# Patient Record
Sex: FEMALE | Race: WHITE | Employment: FULL TIME
[De-identification: names, ages, dates, MRNs, and addresses within clinical notes are randomized per-mention and may not be internally consistent; named-entity substitution may affect disease eponyms.]

---

## 2017-01-03 ENCOUNTER — OFFICE VISIT (OUTPATIENT)
Dept: FAMILY MEDICINE CLINIC | Facility: CLINIC | Age: 27
End: 2017-01-03

## 2017-01-03 VITALS
HEART RATE: 92 BPM | WEIGHT: 155 LBS | DIASTOLIC BLOOD PRESSURE: 70 MMHG | BODY MASS INDEX: 21.62 KG/M2 | SYSTOLIC BLOOD PRESSURE: 124 MMHG

## 2017-01-03 DIAGNOSIS — R10.11 RIGHT UPPER QUADRANT ABDOMINAL PAIN: Primary | ICD-10-CM

## 2017-01-03 PROCEDURE — 99213 OFFICE O/P EST LOW 20 MIN: CPT | Performed by: FAMILY MEDICINE

## 2017-01-03 RX ORDER — OMEPRAZOLE 20 MG/1
20 CAPSULE, DELAYED RELEASE ORAL DAILY
COMMUNITY
End: 2017-03-27 | Stop reason: ALTCHOICE

## 2017-01-11 DIAGNOSIS — R10.11 RIGHT UPPER QUADRANT ABDOMINAL PAIN: ICD-10-CM

## 2017-01-13 ENCOUNTER — TELEPHONE (OUTPATIENT)
Dept: FAMILY MEDICINE CLINIC | Facility: CLINIC | Age: 27
End: 2017-01-13

## 2017-01-13 DIAGNOSIS — K82.8 BILIARY DYSKINESIA: Primary | ICD-10-CM

## 2017-03-27 ENCOUNTER — OFFICE VISIT (OUTPATIENT)
Dept: FAMILY MEDICINE CLINIC | Age: 27
End: 2017-03-27
Payer: MEDICARE

## 2017-03-27 ENCOUNTER — TELEPHONE (OUTPATIENT)
Dept: FAMILY MEDICINE CLINIC | Age: 27
End: 2017-03-27

## 2017-03-27 VITALS
HEART RATE: 96 BPM | OXYGEN SATURATION: 98 % | BODY MASS INDEX: 21.48 KG/M2 | DIASTOLIC BLOOD PRESSURE: 80 MMHG | WEIGHT: 154 LBS | SYSTOLIC BLOOD PRESSURE: 110 MMHG

## 2017-03-27 DIAGNOSIS — S83.8X1A MENISCAL INJURY, RIGHT, INITIAL ENCOUNTER: Primary | ICD-10-CM

## 2017-03-27 PROCEDURE — 99214 OFFICE O/P EST MOD 30 MIN: CPT | Performed by: FAMILY MEDICINE

## 2017-03-27 RX ORDER — HYDROCODONE BITARTRATE AND ACETAMINOPHEN 5; 325 MG/1; MG/1
TABLET ORAL
Qty: 50 TABLET | Refills: 0 | Status: SHIPPED | OUTPATIENT
Start: 2017-03-27 | End: 2017-12-06

## 2017-04-04 ENCOUNTER — TELEPHONE (OUTPATIENT)
Dept: FAMILY MEDICINE CLINIC | Age: 27
End: 2017-04-04

## 2017-04-07 ENCOUNTER — TELEPHONE (OUTPATIENT)
Dept: FAMILY MEDICINE CLINIC | Age: 27
End: 2017-04-07

## 2017-04-07 DIAGNOSIS — S83.8X1A MENISCAL INJURY, RIGHT, INITIAL ENCOUNTER: ICD-10-CM

## 2017-04-07 DIAGNOSIS — S83.209A MENISCUS TEAR: Primary | ICD-10-CM

## 2017-12-06 ENCOUNTER — HOSPITAL ENCOUNTER (OUTPATIENT)
Age: 27
Setting detail: SPECIMEN
Discharge: HOME | End: 2017-12-06
Payer: MEDICARE

## 2017-12-06 ENCOUNTER — OFFICE VISIT (OUTPATIENT)
Dept: FAMILY MEDICINE CLINIC | Age: 27
End: 2017-12-06
Payer: MEDICARE

## 2017-12-06 VITALS
DIASTOLIC BLOOD PRESSURE: 74 MMHG | OXYGEN SATURATION: 99 % | HEIGHT: 65 IN | BODY MASS INDEX: 26.49 KG/M2 | WEIGHT: 159 LBS | SYSTOLIC BLOOD PRESSURE: 108 MMHG | HEART RATE: 86 BPM

## 2017-12-06 DIAGNOSIS — R10.11 RUQ PAIN: ICD-10-CM

## 2017-12-06 DIAGNOSIS — R10.11 RUQ PAIN: Primary | ICD-10-CM

## 2017-12-06 LAB
ABSOLUTE EOS #: 0.12 K/UL (ref 0–0.44)
ABSOLUTE IMMATURE GRANULOCYTE: 0.03 K/UL (ref 0–0.3)
ABSOLUTE LYMPH #: 3.01 K/UL (ref 1.1–3.7)
ABSOLUTE MONO #: 0.46 K/UL (ref 0.1–1.2)
ALBUMIN SERPL-MCNC: 4.9 G/DL (ref 3.5–5.2)
ALBUMIN/GLOBULIN RATIO: 2 (ref 1–2.5)
ALP BLD-CCNC: 40 U/L (ref 35–104)
ALT SERPL-CCNC: 13 U/L (ref 5–33)
ANION GAP SERPL CALCULATED.3IONS-SCNC: 15 MMOL/L (ref 9–17)
AST SERPL-CCNC: 14 U/L
BASOPHILS # BLD: 1 % (ref 0–2)
BASOPHILS ABSOLUTE: 0.05 K/UL (ref 0–0.2)
BILIRUB SERPL-MCNC: 0.59 MG/DL (ref 0.3–1.2)
BILIRUBIN URINE: NEGATIVE
BUN BLDV-MCNC: 10 MG/DL (ref 6–20)
BUN/CREAT BLD: NORMAL (ref 9–20)
CALCIUM SERPL-MCNC: 9.2 MG/DL (ref 8.6–10.4)
CHLORIDE BLD-SCNC: 99 MMOL/L (ref 98–107)
CO2: 23 MMOL/L (ref 20–31)
COLOR: YELLOW
COMMENT UA: NORMAL
CREAT SERPL-MCNC: 0.55 MG/DL (ref 0.5–0.9)
DIFFERENTIAL TYPE: ABNORMAL
EOSINOPHILS RELATIVE PERCENT: 1 % (ref 1–4)
GFR AFRICAN AMERICAN: >60 ML/MIN
GFR NON-AFRICAN AMERICAN: >60 ML/MIN
GFR SERPL CREATININE-BSD FRML MDRD: NORMAL ML/MIN/{1.73_M2}
GFR SERPL CREATININE-BSD FRML MDRD: NORMAL ML/MIN/{1.73_M2}
GLUCOSE BLD-MCNC: 84 MG/DL (ref 70–99)
GLUCOSE URINE: NEGATIVE
HCT VFR BLD CALC: 46.8 % (ref 36.3–47.1)
HEMOGLOBIN: 15.4 G/DL (ref 11.9–15.1)
IMMATURE GRANULOCYTES: 0 %
KETONES, URINE: NEGATIVE
LEUKOCYTE ESTERASE, URINE: NEGATIVE
LYMPHOCYTES # BLD: 36 % (ref 24–43)
MCH RBC QN AUTO: 28.8 PG (ref 25.2–33.5)
MCHC RBC AUTO-ENTMCNC: 32.9 G/DL (ref 28.4–34.8)
MCV RBC AUTO: 87.6 FL (ref 82.6–102.9)
MONOCYTES # BLD: 6 % (ref 3–12)
NITRITE, URINE: NEGATIVE
PDW BLD-RTO: 12.6 % (ref 11.8–14.4)
PH UA: 6 (ref 5–8)
PLATELET # BLD: 358 K/UL (ref 138–453)
PLATELET ESTIMATE: ABNORMAL
PMV BLD AUTO: 10.3 FL (ref 8.1–13.5)
POTASSIUM SERPL-SCNC: 4.5 MMOL/L (ref 3.7–5.3)
PROTEIN UA: NEGATIVE
RBC # BLD: 5.34 M/UL (ref 3.95–5.11)
RBC # BLD: ABNORMAL 10*6/UL
SEG NEUTROPHILS: 56 % (ref 36–65)
SEGMENTED NEUTROPHILS ABSOLUTE COUNT: 4.74 K/UL (ref 1.5–8.1)
SODIUM BLD-SCNC: 137 MMOL/L (ref 135–144)
SPECIFIC GRAVITY UA: 1.02 (ref 1–1.03)
TOTAL PROTEIN: 7.3 G/DL (ref 6.4–8.3)
TURBIDITY: CLEAR
URINE HGB: NEGATIVE
UROBILINOGEN, URINE: NORMAL
WBC # BLD: 8.4 K/UL (ref 3.5–11.3)
WBC # BLD: ABNORMAL 10*3/UL

## 2017-12-06 PROCEDURE — 4004F PT TOBACCO SCREEN RCVD TLK: CPT | Performed by: FAMILY MEDICINE

## 2017-12-06 PROCEDURE — G8427 DOCREV CUR MEDS BY ELIG CLIN: HCPCS | Performed by: FAMILY MEDICINE

## 2017-12-06 PROCEDURE — G8419 CALC BMI OUT NRM PARAM NOF/U: HCPCS | Performed by: FAMILY MEDICINE

## 2017-12-06 PROCEDURE — G8484 FLU IMMUNIZE NO ADMIN: HCPCS | Performed by: FAMILY MEDICINE

## 2017-12-06 PROCEDURE — 99213 OFFICE O/P EST LOW 20 MIN: CPT | Performed by: FAMILY MEDICINE

## 2017-12-06 ASSESSMENT — ENCOUNTER SYMPTOMS
BLOOD IN STOOL: 0
ABDOMINAL PAIN: 1
NAUSEA: 0
CONSTIPATION: 0
DIARRHEA: 0

## 2017-12-06 NOTE — PROGRESS NOTES
Visit Information    Have you changed or started any medications since your last visit including any over-the-counter medicines, vitamins, or herbal medicines? no   Have you stopped taking any of your medications? Is so, why? -  no  Are you having any side effects from any of your medications? - no    Have you seen any other physician or provider since your last visit?  no   Have you had any other diagnostic tests since your last visit?  no   Have you been seen in the emergency room and/or had an admission in a hospital since we last saw you?  no   Have you had your routine dental cleaning in the past 6 months?  no     Do you have an active MyChart account? If no, what is the barrier?   Yes    Patient Care Team:  Chelle Del Rio MD as PCP - General    Medical History Review  Past Medical, Family, and Social History reviewed and does contribute to the patient presenting condition    Health Maintenance   Topic Date Due    HIV screen  04/25/2005    Pneumococcal med risk (1 of 1 - PPSV23) 04/25/2009    Cervical cancer screen  04/25/2011    Flu vaccine (1) 09/01/2017    DTaP/Tdap/Td vaccine (2 - Td) 02/05/2018

## 2017-12-06 NOTE — PROGRESS NOTES
Subjective:      Patient ID: Keily Falcon is a 32 y.o. female. HPI  2 day hx bite abd,  She noted the bite 2 days ago felt was a spider this appears to be resolving then she developed the pain after that,  No  gi sx. Dev ruq pain since, sl in back too lmp is week ago,  Had gely this year,  Review of Systems   Constitutional: Negative for appetite change and fever. Gastrointestinal: Positive for abdominal pain. Negative for blood in stool, constipation, diarrhea and nausea. Genitourinary: Negative. Objective:   Physical Exam   Constitutional: She is oriented to person, place, and time. She appears well-developed and well-nourished. No distress. Abdominal: Soft. Bowel sounds are normal. She exhibits no distension and no mass. There is tenderness (pos murphys,   sl rt  cva pain). There is no rebound and no guarding. Musculoskeletal: She exhibits no edema. Neurological: She is alert and oriented to person, place, and time. Skin: Skin is warm and dry. No rash noted. She is not diaphoretic. No erythema. She has a faint oval lesion on the left upper quadrant about the size of a dime no erythema doesn't appear infected just slight scaling 2 small scabs   Psychiatric: She has a normal mood and affect.  Her behavior is normal. Thought content normal.       Assessment:      insect bite  ruq pain? etiology      Plan:      Labs  F/u accordingly

## 2017-12-08 DIAGNOSIS — R10.11 RUQ PAIN: Primary | ICD-10-CM

## 2017-12-13 DIAGNOSIS — R10.11 RUQ PAIN: Primary | ICD-10-CM

## 2017-12-22 ENCOUNTER — HOSPITAL ENCOUNTER (OUTPATIENT)
Dept: ULTRASOUND IMAGING | Age: 27
Discharge: HOME | End: 2017-12-22
Payer: MEDICARE

## 2017-12-22 DIAGNOSIS — R10.11 RUQ PAIN: ICD-10-CM

## 2017-12-22 PROCEDURE — 76705 ECHO EXAM OF ABDOMEN: CPT

## 2018-03-22 ENCOUNTER — TELEPHONE (OUTPATIENT)
Dept: FAMILY MEDICINE CLINIC | Age: 28
End: 2018-03-22

## 2018-03-30 ENCOUNTER — OFFICE VISIT (OUTPATIENT)
Dept: FAMILY MEDICINE CLINIC | Age: 28
End: 2018-03-30
Payer: MEDICARE

## 2018-03-30 VITALS
HEART RATE: 90 BPM | SYSTOLIC BLOOD PRESSURE: 110 MMHG | BODY MASS INDEX: 26.96 KG/M2 | WEIGHT: 162 LBS | DIASTOLIC BLOOD PRESSURE: 80 MMHG | OXYGEN SATURATION: 98 %

## 2018-03-30 DIAGNOSIS — Z00.00 WELL ADULT EXAM: Primary | ICD-10-CM

## 2018-03-30 DIAGNOSIS — K21.9 GASTROESOPHAGEAL REFLUX DISEASE WITHOUT ESOPHAGITIS: ICD-10-CM

## 2018-03-30 DIAGNOSIS — F17.200 SMOKER: Chronic | ICD-10-CM

## 2018-03-30 PROCEDURE — 90471 IMMUNIZATION ADMIN: CPT | Performed by: FAMILY MEDICINE

## 2018-03-30 PROCEDURE — 90715 TDAP VACCINE 7 YRS/> IM: CPT | Performed by: FAMILY MEDICINE

## 2018-03-30 PROCEDURE — 90746 HEPB VACCINE 3 DOSE ADULT IM: CPT | Performed by: FAMILY MEDICINE

## 2018-03-30 PROCEDURE — 99395 PREV VISIT EST AGE 18-39: CPT | Performed by: FAMILY MEDICINE

## 2018-03-30 PROCEDURE — 90472 IMMUNIZATION ADMIN EACH ADD: CPT | Performed by: FAMILY MEDICINE

## 2018-03-30 RX ORDER — MELOXICAM 15 MG/1
TABLET ORAL DAILY
COMMUNITY
Start: 2018-01-23 | End: 2019-10-16 | Stop reason: ALTCHOICE

## 2018-03-30 NOTE — PROGRESS NOTES
of motion. Neck supple. No tracheal deviation present. No abnormal lymphadenopathy. No JVD noted. Carotids are clear bilaterally. No thyroid masses noted. Heart: RRR without murmur. No S3, S4, or gallop noted. Chest: Clear to auscultation bilaterally. Good breath sounds noted. No rales, wheezes, or rhonchi noted. No respiratory retractions noted. Wall has symmetrical movement with respirations. Abdomen: No distension noted.  + bowel sounds in all quadrants which are normoactive. No bruits noted. No masses could be palpated. No unusual pulsatile masses noted. To deep palpation, patient denied any significant pain. No rebound, guarding or rigidity noted to my exam.          Assessment:   Encounter Diagnoses   Name Primary?  Well adult exam Yes    Gastroesophageal reflux disease without esophagitis      Encounter Diagnoses   Name Primary?  Well adult exam Yes    Gastroesophageal reflux disease without esophagitis     Smoker          Plan:   There are no discontinued medications. THE ABOVE NOTED DISCONTINUED MEDS MAY ONLY BE FROM 'CLEANING UP' THE MED LIST AND WERE NOT ACTUALLY CANCELED;  SEE CHART FOR DETAILS! No orders of the defined types were placed in this encounter. Orders Placed This Encounter   Procedures    Tdap (age 10y-63y) IM (Adacel)    Hep B Vaccine Adult (ENGERIX B)    Lipid Panel     Standing Status:   Future     Standing Expiration Date:   3/30/2019     Order Specific Question:   Is Patient Fasting?/# of Hours     Answer:   8 hour fasting (no calories)     No Follow-up on file. Patient Instructions       Patient Education        Gastroesophageal Reflux Disease (GERD): Care Instructions  Your Care Instructions    Gastroesophageal reflux disease (GERD) is the backward flow of stomach acid into the esophagus. The esophagus is the tube that leads from your throat to your stomach. A one-way valve prevents the stomach acid from moving up into this tube.  When you have GERD,

## 2018-07-03 ENCOUNTER — TELEPHONE (OUTPATIENT)
Dept: FAMILY MEDICINE CLINIC | Age: 28
End: 2018-07-03

## 2018-07-03 ENCOUNTER — HOSPITAL ENCOUNTER (OUTPATIENT)
Age: 28
Setting detail: SPECIMEN
Discharge: HOME | End: 2018-07-03
Payer: MEDICARE

## 2018-07-03 ENCOUNTER — OFFICE VISIT (OUTPATIENT)
Dept: FAMILY MEDICINE CLINIC | Age: 28
End: 2018-07-03
Payer: MEDICARE

## 2018-07-03 VITALS
BODY MASS INDEX: 26.33 KG/M2 | DIASTOLIC BLOOD PRESSURE: 72 MMHG | WEIGHT: 158.2 LBS | SYSTOLIC BLOOD PRESSURE: 102 MMHG | TEMPERATURE: 102.9 F | HEART RATE: 98 BPM

## 2018-07-03 DIAGNOSIS — R50.9 FEVER, UNSPECIFIED FEVER CAUSE: ICD-10-CM

## 2018-07-03 DIAGNOSIS — J02.9 ACUTE PHARYNGITIS, UNSPECIFIED ETIOLOGY: Primary | ICD-10-CM

## 2018-07-03 DIAGNOSIS — R10.819 ABDOMINAL TENDERNESS, REBOUND TENDERNESS PRESENCE NOT SPECIFIED, UNSPECIFIED LOCATION: ICD-10-CM

## 2018-07-03 LAB
ABSOLUTE EOS #: <0.03 K/UL (ref 0–0.44)
ABSOLUTE IMMATURE GRANULOCYTE: 0.04 K/UL (ref 0–0.3)
ABSOLUTE LYMPH #: 0.89 K/UL (ref 1.1–3.7)
ABSOLUTE MONO #: 1.23 K/UL (ref 0.1–1.2)
ALBUMIN SERPL-MCNC: 4.4 G/DL (ref 3.5–5.2)
ALBUMIN/GLOBULIN RATIO: 1.6 (ref 1–2.5)
ALP BLD-CCNC: 42 U/L (ref 35–104)
ALT SERPL-CCNC: 65 U/L (ref 5–33)
ANION GAP SERPL CALCULATED.3IONS-SCNC: 14 MMOL/L (ref 9–17)
AST SERPL-CCNC: 44 U/L
BASOPHILS # BLD: 0 % (ref 0–2)
BASOPHILS ABSOLUTE: 0.04 K/UL (ref 0–0.2)
BILIRUB SERPL-MCNC: 0.93 MG/DL (ref 0.3–1.2)
BILIRUBIN, POC: NORMAL
BLOOD URINE, POC: NORMAL
BUN BLDV-MCNC: 4 MG/DL (ref 6–20)
BUN/CREAT BLD: ABNORMAL (ref 9–20)
CALCIUM SERPL-MCNC: 8.9 MG/DL (ref 8.6–10.4)
CHLORIDE BLD-SCNC: 100 MMOL/L (ref 98–107)
CLARITY, POC: CLEAR
CO2: 21 MMOL/L (ref 20–31)
COLOR, POC: NORMAL
CREAT SERPL-MCNC: 0.52 MG/DL (ref 0.5–0.9)
DIFFERENTIAL TYPE: ABNORMAL
EOSINOPHILS RELATIVE PERCENT: 0 % (ref 1–4)
GFR AFRICAN AMERICAN: >60 ML/MIN
GFR NON-AFRICAN AMERICAN: >60 ML/MIN
GFR SERPL CREATININE-BSD FRML MDRD: ABNORMAL ML/MIN/{1.73_M2}
GFR SERPL CREATININE-BSD FRML MDRD: ABNORMAL ML/MIN/{1.73_M2}
GLUCOSE BLD-MCNC: 102 MG/DL (ref 70–99)
GLUCOSE URINE, POC: NORMAL
HCT VFR BLD CALC: 44.4 % (ref 36.3–47.1)
HEMOGLOBIN: 15.1 G/DL (ref 11.9–15.1)
IMMATURE GRANULOCYTES: 0 %
KETONES, POC: NORMAL
LEUKOCYTE EST, POC: NORMAL
LYMPHOCYTES # BLD: 8 % (ref 24–43)
MCH RBC QN AUTO: 29.3 PG (ref 25.2–33.5)
MCHC RBC AUTO-ENTMCNC: 34 G/DL (ref 28.4–34.8)
MCV RBC AUTO: 86 FL (ref 82.6–102.9)
MONOCYTES # BLD: 12 % (ref 3–12)
NITRITE, POC: NORMAL
NRBC AUTOMATED: 0 PER 100 WBC
PDW BLD-RTO: 12.6 % (ref 11.8–14.4)
PH, POC: 6
PLATELET # BLD: 220 K/UL (ref 138–453)
PLATELET ESTIMATE: ABNORMAL
PMV BLD AUTO: 10.1 FL (ref 8.1–13.5)
POTASSIUM SERPL-SCNC: 4 MMOL/L (ref 3.7–5.3)
PROTEIN, POC: 30
RBC # BLD: 5.16 M/UL (ref 3.95–5.11)
RBC # BLD: ABNORMAL 10*6/UL
S PYO AG THROAT QL: NORMAL
SEG NEUTROPHILS: 80 % (ref 36–65)
SEGMENTED NEUTROPHILS ABSOLUTE COUNT: 8.36 K/UL (ref 1.5–8.1)
SODIUM BLD-SCNC: 135 MMOL/L (ref 135–144)
SPECIFIC GRAVITY, POC: 1.01
TOTAL PROTEIN: 7.1 G/DL (ref 6.4–8.3)
UROBILINOGEN, POC: 1
WBC # BLD: 10.6 K/UL (ref 3.5–11.3)
WBC # BLD: ABNORMAL 10*3/UL

## 2018-07-03 PROCEDURE — G8419 CALC BMI OUT NRM PARAM NOF/U: HCPCS | Performed by: FAMILY MEDICINE

## 2018-07-03 PROCEDURE — G8427 DOCREV CUR MEDS BY ELIG CLIN: HCPCS | Performed by: FAMILY MEDICINE

## 2018-07-03 PROCEDURE — 87880 STREP A ASSAY W/OPTIC: CPT | Performed by: FAMILY MEDICINE

## 2018-07-03 PROCEDURE — 99213 OFFICE O/P EST LOW 20 MIN: CPT | Performed by: FAMILY MEDICINE

## 2018-07-03 PROCEDURE — 81002 URINALYSIS NONAUTO W/O SCOPE: CPT | Performed by: FAMILY MEDICINE

## 2018-07-03 PROCEDURE — 4004F PT TOBACCO SCREEN RCVD TLK: CPT | Performed by: FAMILY MEDICINE

## 2018-07-03 RX ORDER — CEPHALEXIN 500 MG/1
500 CAPSULE ORAL 2 TIMES DAILY
Qty: 20 CAPSULE | Refills: 0 | Status: SHIPPED | OUTPATIENT
Start: 2018-07-03 | End: 2018-07-13

## 2018-07-03 NOTE — TELEPHONE ENCOUNTER
Acute respiratory issue     Patient complains of sorethroat, fever,bodyaches  Symptoms for how longstarted 8 pm last night  What meds has pt tried  Tylenol  Does patient have asthma  no  Is patient on inhalers no  Other symptoms include  See above  Is this sinus, cold or cough related  Pt not sure    *This condition is eligible for an eVisit. If not already active, sign patient up for MyChart to improve access and communication with the provider. *

## 2018-07-03 NOTE — PROGRESS NOTES
Subjective:      Patient ID: Leslee Bishop is a 29 y.o. female. HPI     Presents today for evaluation of fever and chills    Symptoms started last night around dinner time. She had chills and sweats all night. She did take some tylenol last night around midnight but none since. No one is sick around her. She does note sore throat that has been present mildly since Thursday but worsened significantly today. No cough, no SOB. Admits to tender adenopathy. No urinary symptoms, normal BM. Some nausea but no vomiting or diarrhea. No vaginal discharge. She has had an ovarian cyst in the past. She is close to her period. Review of Systems   Except as noted in HPI, ROS negative    Objective:   Physical Exam   Constitutional: She is oriented to person, place, and time. HENT:   Head: Normocephalic and atraumatic. Erythema to pharynx. No ulcers seen. TMs WNL bilaterally    Eyes: Conjunctivae are normal.   Neck: Neck supple. Cardiovascular: Regular rhythm and normal heart sounds. Mild tachycardia   Pulmonary/Chest: Effort normal and breath sounds normal. She has no wheezes. She has no rales. Abdominal: Soft. There is tenderness (mild left lower quadrant (this is the same side as her ovarian cyst)). There is no rebound and no guarding. Lymphadenopathy:     She has cervical adenopathy (tender). Neurological: She is alert and oriented to person, place, and time. Coordination normal.   Skin: Skin is warm and dry. Assessment:      1. Acute pharyngitis, unspecified etiology    2. Abdominal tenderness, rebound tenderness presence not specified, unspecified location    3. Fever, unspecified fever cause          Plan:       UA was negative in office  Strep negative but she is meeting centor criteria. Will treat.  She has tolerated Keflex in the past.   Push fluids  Alternate Tylenol and Ibuprofen to get fever down  Given high fever and mild tachycardia will have her get CBC, CMP  Also given her vitals

## 2018-07-04 DIAGNOSIS — D72.89 LEFT SHIFT: ICD-10-CM

## 2018-07-04 DIAGNOSIS — R74.8 ELEVATED LIVER ENZYMES: Primary | ICD-10-CM

## 2018-07-05 ENCOUNTER — HOSPITAL ENCOUNTER (OUTPATIENT)
Age: 28
Setting detail: SPECIMEN
Discharge: HOME | End: 2018-07-05
Payer: MEDICARE

## 2018-07-05 DIAGNOSIS — R74.8 ELEVATED LIVER ENZYMES: ICD-10-CM

## 2018-07-05 DIAGNOSIS — Z00.00 WELL ADULT EXAM: ICD-10-CM

## 2018-07-05 DIAGNOSIS — D72.89 LEFT SHIFT: ICD-10-CM

## 2018-07-05 LAB
ABSOLUTE EOS #: 0.07 K/UL (ref 0–0.44)
ABSOLUTE IMMATURE GRANULOCYTE: <0.03 K/UL (ref 0–0.3)
ABSOLUTE LYMPH #: 1.6 K/UL (ref 1.1–3.7)
ABSOLUTE MONO #: 0.49 K/UL (ref 0.1–1.2)
ALBUMIN SERPL-MCNC: 4.2 G/DL (ref 3.5–5.2)
ALBUMIN/GLOBULIN RATIO: 1.4 (ref 1–2.5)
ALP BLD-CCNC: 42 U/L (ref 35–104)
ALT SERPL-CCNC: 44 U/L (ref 5–33)
ANION GAP SERPL CALCULATED.3IONS-SCNC: 14 MMOL/L (ref 9–17)
AST SERPL-CCNC: 18 U/L
BASOPHILS # BLD: 1 % (ref 0–2)
BASOPHILS ABSOLUTE: <0.03 K/UL (ref 0–0.2)
BILIRUB SERPL-MCNC: 0.43 MG/DL (ref 0.3–1.2)
BUN BLDV-MCNC: 7 MG/DL (ref 6–20)
BUN/CREAT BLD: ABNORMAL (ref 9–20)
CALCIUM SERPL-MCNC: 9.3 MG/DL (ref 8.6–10.4)
CHLORIDE BLD-SCNC: 104 MMOL/L (ref 98–107)
CHOLESTEROL/HDL RATIO: 4
CHOLESTEROL: 135 MG/DL
CO2: 22 MMOL/L (ref 20–31)
CREAT SERPL-MCNC: 0.57 MG/DL (ref 0.5–0.9)
DIFFERENTIAL TYPE: NORMAL
EOSINOPHILS RELATIVE PERCENT: 2 % (ref 1–4)
GFR AFRICAN AMERICAN: >60 ML/MIN
GFR NON-AFRICAN AMERICAN: >60 ML/MIN
GFR SERPL CREATININE-BSD FRML MDRD: ABNORMAL ML/MIN/{1.73_M2}
GFR SERPL CREATININE-BSD FRML MDRD: ABNORMAL ML/MIN/{1.73_M2}
GLUCOSE BLD-MCNC: 105 MG/DL (ref 70–99)
HCT VFR BLD CALC: 44.6 % (ref 36.3–47.1)
HDLC SERPL-MCNC: 34 MG/DL
HEMOGLOBIN: 14.7 G/DL (ref 11.9–15.1)
IMMATURE GRANULOCYTES: 0 %
LDL CHOLESTEROL: 87 MG/DL (ref 0–130)
LYMPHOCYTES # BLD: 36 % (ref 24–43)
MCH RBC QN AUTO: 28.8 PG (ref 25.2–33.5)
MCHC RBC AUTO-ENTMCNC: 33 G/DL (ref 28.4–34.8)
MCV RBC AUTO: 87.5 FL (ref 82.6–102.9)
MONOCYTES # BLD: 11 % (ref 3–12)
NRBC AUTOMATED: 0 PER 100 WBC
PDW BLD-RTO: 13 % (ref 11.8–14.4)
PLATELET # BLD: 261 K/UL (ref 138–453)
PLATELET ESTIMATE: NORMAL
PMV BLD AUTO: 10.6 FL (ref 8.1–13.5)
POTASSIUM SERPL-SCNC: 4.6 MMOL/L (ref 3.7–5.3)
RBC # BLD: 5.1 M/UL (ref 3.95–5.11)
RBC # BLD: NORMAL 10*6/UL
SEG NEUTROPHILS: 51 % (ref 36–65)
SEGMENTED NEUTROPHILS ABSOLUTE COUNT: 2.24 K/UL (ref 1.5–8.1)
SODIUM BLD-SCNC: 140 MMOL/L (ref 135–144)
TOTAL PROTEIN: 7.1 G/DL (ref 6.4–8.3)
TRIGL SERPL-MCNC: 68 MG/DL
VLDLC SERPL CALC-MCNC: ABNORMAL MG/DL (ref 1–30)
WBC # BLD: 4.4 K/UL (ref 3.5–11.3)
WBC # BLD: NORMAL 10*3/UL

## 2018-09-18 ENCOUNTER — OFFICE VISIT (OUTPATIENT)
Dept: FAMILY MEDICINE CLINIC | Age: 28
End: 2018-09-18
Payer: MEDICARE

## 2018-09-18 VITALS
DIASTOLIC BLOOD PRESSURE: 80 MMHG | BODY MASS INDEX: 26.83 KG/M2 | SYSTOLIC BLOOD PRESSURE: 120 MMHG | HEART RATE: 90 BPM | RESPIRATION RATE: 18 BRPM | WEIGHT: 161.25 LBS

## 2018-09-18 DIAGNOSIS — G43.109 MIGRAINE WITH AURA AND WITHOUT STATUS MIGRAINOSUS, NOT INTRACTABLE: Primary | ICD-10-CM

## 2018-09-18 PROCEDURE — G8427 DOCREV CUR MEDS BY ELIG CLIN: HCPCS | Performed by: NURSE PRACTITIONER

## 2018-09-18 PROCEDURE — G8419 CALC BMI OUT NRM PARAM NOF/U: HCPCS | Performed by: NURSE PRACTITIONER

## 2018-09-18 PROCEDURE — 4004F PT TOBACCO SCREEN RCVD TLK: CPT | Performed by: NURSE PRACTITIONER

## 2018-09-18 PROCEDURE — 99213 OFFICE O/P EST LOW 20 MIN: CPT | Performed by: NURSE PRACTITIONER

## 2018-09-18 RX ORDER — SUMATRIPTAN 50 MG/1
50 TABLET, FILM COATED ORAL
Qty: 9 TABLET | Refills: 2 | Status: SHIPPED | OUTPATIENT
Start: 2018-09-18 | End: 2019-10-16

## 2018-09-18 ASSESSMENT — ENCOUNTER SYMPTOMS
SWOLLEN GLANDS: 0
COLOR CHANGE: 0
PHOTOPHOBIA: 1
EYE WATERING: 0
VISUAL CHANGE: 1
BLURRED VISION: 1
EYE REDNESS: 0
NAUSEA: 1
SINUS PRESSURE: 0
COUGH: 0
EYE PAIN: 0
VOMITING: 0
RESPIRATORY NEGATIVE: 1
SORE THROAT: 0

## 2018-09-18 NOTE — PROGRESS NOTES
radiate. The pain quality is similar to prior headaches (similar to May new onset). The quality of the pain is described as throbbing. The pain is at a severity of 10/10. Associated symptoms include blurred vision (that moves to peripheral), nausea, phonophobia, photophobia and a visual change (1-2 hours prior to onset, then resolves). Pertinent negatives include no abnormal behavior, anorexia, coughing, dizziness, eye pain, eye redness, eye watering, loss of balance, numbness, seizures, sinus pressure, sore throat, swollen glands, tingling, vomiting or weakness. Nothing (nothing patient can pinpoint) aggravates the symptoms. She has tried darkened room (Ibuprofen, Generic headache (extra strength)) for the symptoms. The treatment provided no relief. Subjective:      Review of Systems   Constitutional: Negative. HENT: Negative. Negative for sinus pressure and sore throat. Eyes: Positive for blurred vision (that moves to peripheral), photophobia and visual disturbance. Negative for pain and redness. Respiratory: Negative. Negative for cough. Cardiovascular: Negative. Gastrointestinal: Positive for nausea. Negative for anorexia and vomiting. Endocrine: Negative. Genitourinary: Negative. Musculoskeletal: Negative. Skin: Negative. Negative for color change, pallor, rash and wound. Neurological: Positive for headaches. Negative for dizziness, tingling, tremors, seizures, syncope, facial asymmetry, speech difficulty, weakness, light-headedness, numbness and loss of balance. Objective:     Vitals:    09/18/18 0745   BP: 120/80   Pulse: 90   Resp: 18       Body mass index is 26.83 kg/m². Physical Exam   Constitutional: She is oriented to person, place, and time. She appears well-developed and well-nourished. Non-toxic appearance. She does not have a sickly appearance. She does not appear ill. No distress. HENT:   Head: Normocephalic and atraumatic.    Right Ear: External

## 2018-09-18 NOTE — PATIENT INSTRUCTIONS
Patient Education        Influenza (Flu) Vaccine (Inactivated or Recombinant): What You Need to Know  Why get vaccinated? Influenza (\"flu\") is a contagious disease that spreads around the [place] every winter, usually between October and May. Flu is caused by influenza viruses and is spread mainly by coughing, sneezing, and close contact. Anyone can get flu. Flu strikes suddenly and can last several days. Symptoms vary by age, but can include:  · Fever/chills. · Sore throat. · Muscle aches. · Fatigue. · Cough. · Headache. · Runny or stuffy nose. Flu can also lead to pneumonia and blood infections, and cause diarrhea and seizures in children. If you have a medical condition, such as heart or lung disease, flu can make it worse. Flu is more dangerous for some people. Infants and young children, people 72years of age and older, pregnant women, and people with certain health conditions or a weakened immune system are at greatest risk. Each year thousands of people in the [facility] die from flu, and many more are hospitalized. Flu vaccine can:  · Keep you from getting flu. · Make flu less severe if you do get it. · Keep you from spreading flu to your family and other people. Inactivated and recombinant flu vaccines  A dose of flu vaccine is recommended every flu season. Children 6 months through 6years of age may need two doses during the same flu season. Everyone else needs only one dose each flu season. Some inactivated flu vaccines contain a very small amount of a mercury-based preservative called thimerosal. Studies have not shown thimerosal in vaccines to be harmful, but flu vaccines that do not contain thimerosal are available. There is no live flu virus in flu shots. They cannot cause the flu. There are many flu viruses, and they are always changing.  Each year a new flu vaccine is made to protect against three or four viruses that are likely to cause disease in the upcoming flu you take only when you get a migraine and medicine that you take all the time to help prevent migraines. ¨ If your doctor has prescribed medicine for when you get a headache, take it at the first sign of a migraine, unless your doctor has given you other instructions. ¨ If your doctor has prescribed medicine to prevent migraines, take it exactly as prescribed. Call your doctor if you think you are having a problem with your medicine. · Find healthy ways to deal with stress. Migraines are most common during or right after stressful times. Take time to relax before and after you do something that has caused a migraine in the past.  · Try to keep your muscles relaxed by keeping good posture. Check your jaw, face, neck, and shoulder muscles for tension. Try to relax them. When you sit at a desk, change positions often. And make sure to stretch for 30 seconds each hour. · Get plenty of sleep and exercise. · Eat meals on a regular schedule. Avoid foods and drinks that often trigger migraines. These include chocolate, alcohol (especially red wine and port), aspartame, monosodium glutamate (MSG), and some additives found in foods (such as hot dogs, root, cold cuts, aged cheeses, and pickled foods). · Limit caffeine. Don't drink too much coffee, tea, or soda. But don't quit caffeine suddenly. That can also give you migraines. · Do not smoke or allow others to smoke around you. If you need help quitting, talk to your doctor about stop-smoking programs and medicines. These can increase your chances of quitting for good. · If you are taking birth control pills or hormone therapy, talk to your doctor about whether they are triggering your migraines. When should you call for help? Call 911 anytime you think you may need emergency care. For example, call if:    · You have signs of a stroke.  These may include:  ¨ Sudden numbness, paralysis, or weakness in your face, arm, or leg, especially on only one side of your

## 2019-07-01 ENCOUNTER — HOSPITAL ENCOUNTER (OUTPATIENT)
Age: 29
Setting detail: SPECIMEN
Discharge: HOME | End: 2019-07-01
Payer: COMMERCIAL

## 2019-07-02 LAB
CMV IGM: 0.1
CYTOMEGALOVIRUS IGG ANTIBODY: 8.2

## 2019-07-24 ENCOUNTER — HOSPITAL ENCOUNTER (OUTPATIENT)
Age: 29
Setting detail: SPECIMEN
Discharge: HOME | End: 2019-07-24
Payer: COMMERCIAL

## 2019-07-24 LAB
PROLACTIN: 17.1 UG/L (ref 4.79–23.3)
TSH SERPL DL<=0.05 MIU/L-ACNC: 1.14 MIU/L (ref 0.3–5)

## 2019-07-28 LAB — ANTI-MULLERIAN HORMONE: 12 NG/ML (ref 0.4–16.02)

## 2019-09-04 ENCOUNTER — HOSPITAL ENCOUNTER (OUTPATIENT)
Age: 29
Setting detail: SPECIMEN
Discharge: HOME | End: 2019-09-04
Payer: COMMERCIAL

## 2019-09-04 LAB
ESTRADIOL LEVEL: 198 PG/ML (ref 27–314)
FOLLICLE STIMULATING HORMONE: 6.4 U/L (ref 1.7–21.5)
LH: 27.1 U/L (ref 1–95.6)
PROGESTERONE LEVEL: 0.73 NG/ML

## 2019-10-16 ENCOUNTER — OFFICE VISIT (OUTPATIENT)
Dept: FAMILY MEDICINE CLINIC | Age: 29
End: 2019-10-16
Payer: COMMERCIAL

## 2019-10-16 VITALS
HEART RATE: 87 BPM | HEIGHT: 66 IN | WEIGHT: 180 LBS | OXYGEN SATURATION: 100 % | BODY MASS INDEX: 28.93 KG/M2 | DIASTOLIC BLOOD PRESSURE: 78 MMHG | SYSTOLIC BLOOD PRESSURE: 120 MMHG

## 2019-10-16 DIAGNOSIS — E16.2 HYPOGLYCEMIA: Primary | ICD-10-CM

## 2019-10-16 DIAGNOSIS — R25.1 TREMULOUSNESS: ICD-10-CM

## 2019-10-16 PROBLEM — F17.200 SMOKER: Chronic | Status: RESOLVED | Noted: 2018-03-30 | Resolved: 2019-10-16

## 2019-10-16 PROCEDURE — 99213 OFFICE O/P EST LOW 20 MIN: CPT | Performed by: FAMILY MEDICINE

## 2019-10-16 RX ORDER — DOXYCYCLINE HYCLATE 100 MG/1
CAPSULE ORAL
Refills: 0 | COMMUNITY
Start: 2019-07-20 | End: 2019-10-16 | Stop reason: ALTCHOICE

## 2019-10-16 ASSESSMENT — PATIENT HEALTH QUESTIONNAIRE - PHQ9
SUM OF ALL RESPONSES TO PHQ QUESTIONS 1-9: 0
1. LITTLE INTEREST OR PLEASURE IN DOING THINGS: 0
2. FEELING DOWN, DEPRESSED OR HOPELESS: 0
SUM OF ALL RESPONSES TO PHQ QUESTIONS 1-9: 0
SUM OF ALL RESPONSES TO PHQ9 QUESTIONS 1 & 2: 0

## 2019-10-17 ENCOUNTER — HOSPITAL ENCOUNTER (OUTPATIENT)
Age: 29
Setting detail: SPECIMEN
Discharge: HOME | End: 2019-10-17
Payer: COMMERCIAL

## 2019-10-17 DIAGNOSIS — R25.1 TREMULOUSNESS: ICD-10-CM

## 2019-10-17 DIAGNOSIS — E16.2 HYPOGLYCEMIA: ICD-10-CM

## 2019-10-17 LAB
ABSOLUTE EOS #: 0.17 K/UL (ref 0–0.44)
ABSOLUTE IMMATURE GRANULOCYTE: <0.03 K/UL (ref 0–0.3)
ABSOLUTE LYMPH #: 2.06 K/UL (ref 1.1–3.7)
ABSOLUTE MONO #: 0.54 K/UL (ref 0.1–1.2)
ALBUMIN SERPL-MCNC: 4.5 G/DL (ref 3.5–5.2)
ALBUMIN/GLOBULIN RATIO: 1.6 (ref 1–2.5)
ALP BLD-CCNC: 43 U/L (ref 35–104)
ALT SERPL-CCNC: 14 U/L (ref 5–33)
ANION GAP SERPL CALCULATED.3IONS-SCNC: 17 MMOL/L (ref 9–17)
AST SERPL-CCNC: 15 U/L
BASOPHILS # BLD: 1 % (ref 0–2)
BASOPHILS ABSOLUTE: 0.05 K/UL (ref 0–0.2)
BILIRUB SERPL-MCNC: 0.63 MG/DL (ref 0.3–1.2)
BUN BLDV-MCNC: 9 MG/DL (ref 6–20)
BUN/CREAT BLD: NORMAL (ref 9–20)
CALCIUM SERPL-MCNC: 9.5 MG/DL (ref 8.6–10.4)
CHLORIDE BLD-SCNC: 105 MMOL/L (ref 98–107)
CO2: 20 MMOL/L (ref 20–31)
CREAT SERPL-MCNC: 0.54 MG/DL (ref 0.5–0.9)
DIFFERENTIAL TYPE: ABNORMAL
EOSINOPHILS RELATIVE PERCENT: 2 % (ref 1–4)
GFR AFRICAN AMERICAN: >60 ML/MIN
GFR NON-AFRICAN AMERICAN: >60 ML/MIN
GFR SERPL CREATININE-BSD FRML MDRD: NORMAL ML/MIN/{1.73_M2}
GFR SERPL CREATININE-BSD FRML MDRD: NORMAL ML/MIN/{1.73_M2}
GLUCOSE BLD-MCNC: 87 MG/DL (ref 70–99)
HCT VFR BLD CALC: 48.1 % (ref 36.3–47.1)
HEMOGLOBIN: 15.8 G/DL (ref 11.9–15.1)
IMMATURE GRANULOCYTES: 0 %
LYMPHOCYTES # BLD: 25 % (ref 24–43)
MAGNESIUM: 2 MG/DL (ref 1.6–2.6)
MCH RBC QN AUTO: 28.7 PG (ref 25.2–33.5)
MCHC RBC AUTO-ENTMCNC: 32.8 G/DL (ref 28.4–34.8)
MCV RBC AUTO: 87.3 FL (ref 82.6–102.9)
MONOCYTES # BLD: 7 % (ref 3–12)
NRBC AUTOMATED: 0 PER 100 WBC
PDW BLD-RTO: 13.2 % (ref 11.8–14.4)
PLATELET # BLD: 358 K/UL (ref 138–453)
PLATELET ESTIMATE: ABNORMAL
PMV BLD AUTO: 9.9 FL (ref 8.1–13.5)
POTASSIUM SERPL-SCNC: 4.4 MMOL/L (ref 3.7–5.3)
RBC # BLD: 5.51 M/UL (ref 3.95–5.11)
RBC # BLD: ABNORMAL 10*6/UL
SEG NEUTROPHILS: 65 % (ref 36–65)
SEGMENTED NEUTROPHILS ABSOLUTE COUNT: 5.26 K/UL (ref 1.5–8.1)
SODIUM BLD-SCNC: 142 MMOL/L (ref 135–144)
TOTAL PROTEIN: 7.4 G/DL (ref 6.4–8.3)
TSH SERPL DL<=0.05 MIU/L-ACNC: 1.14 MIU/L (ref 0.3–5)
WBC # BLD: 8.1 K/UL (ref 3.5–11.3)
WBC # BLD: ABNORMAL 10*3/UL

## 2019-11-25 ENCOUNTER — HOSPITAL ENCOUNTER (OUTPATIENT)
Age: 29
Setting detail: SPECIMEN
Discharge: HOME | End: 2019-11-25

## 2019-11-25 LAB
HCG QUANTITATIVE: 2724 IU/L
PROGESTERONE LEVEL: 23.26 NG/ML

## 2019-11-28 LAB
ESTRADIOL LEVEL: 667 PG/ML
ESTROGEN TOTAL: 874 PG/ML
ESTRONE: 207 PG/ML

## 2019-12-05 ENCOUNTER — HOSPITAL ENCOUNTER (OUTPATIENT)
Age: 29
Setting detail: SPECIMEN
Discharge: HOME | End: 2019-12-05

## 2019-12-05 LAB
PROGESTERONE LEVEL: 18.49 NG/ML
TSH SERPL DL<=0.05 MIU/L-ACNC: 1.18 MIU/L (ref 0.3–5)

## 2019-12-08 LAB
ESTRADIOL LEVEL: 954 PG/ML
ESTROGEN TOTAL: 1302 PG/ML
ESTRONE: 348 PG/ML

## 2019-12-19 ENCOUNTER — HOSPITAL ENCOUNTER (OUTPATIENT)
Age: 29
Setting detail: SPECIMEN
Discharge: HOME | End: 2019-12-19

## 2019-12-19 LAB — PROGESTERONE LEVEL: 21.36 NG/ML

## 2019-12-22 LAB
ESTRADIOL LEVEL: 1630 PG/ML
ESTROGEN TOTAL: 2020 PG/ML
ESTRONE: 390 PG/ML

## 2020-01-31 ENCOUNTER — OFFICE VISIT (OUTPATIENT)
Dept: FAMILY MEDICINE CLINIC | Age: 30
End: 2020-01-31
Payer: COMMERCIAL

## 2020-01-31 VITALS
BODY MASS INDEX: 28.31 KG/M2 | WEIGHT: 177.2 LBS | SYSTOLIC BLOOD PRESSURE: 118 MMHG | TEMPERATURE: 98.8 F | DIASTOLIC BLOOD PRESSURE: 82 MMHG | HEART RATE: 99 BPM | OXYGEN SATURATION: 99 %

## 2020-01-31 PROCEDURE — G8419 CALC BMI OUT NRM PARAM NOF/U: HCPCS | Performed by: NURSE PRACTITIONER

## 2020-01-31 PROCEDURE — G8427 DOCREV CUR MEDS BY ELIG CLIN: HCPCS | Performed by: NURSE PRACTITIONER

## 2020-01-31 PROCEDURE — G8484 FLU IMMUNIZE NO ADMIN: HCPCS | Performed by: NURSE PRACTITIONER

## 2020-01-31 PROCEDURE — 99213 OFFICE O/P EST LOW 20 MIN: CPT | Performed by: NURSE PRACTITIONER

## 2020-01-31 PROCEDURE — 4004F PT TOBACCO SCREEN RCVD TLK: CPT | Performed by: NURSE PRACTITIONER

## 2020-01-31 ASSESSMENT — ENCOUNTER SYMPTOMS
EYES NEGATIVE: 1
DIARRHEA: 0
SINUS PRESSURE: 0
ALLERGIC/IMMUNOLOGIC NEGATIVE: 1
NAUSEA: 1
STRIDOR: 0
CHOKING: 0
VOMITING: 1
TROUBLE SWALLOWING: 0
WHEEZING: 0
CHEST TIGHTNESS: 1
VOICE CHANGE: 0
RHINORRHEA: 1
FACIAL SWELLING: 0
COUGH: 1
SHORTNESS OF BREATH: 0
SINUS PAIN: 0
SORE THROAT: 1
COLOR CHANGE: 0

## 2020-01-31 ASSESSMENT — PATIENT HEALTH QUESTIONNAIRE - PHQ9
2. FEELING DOWN, DEPRESSED OR HOPELESS: 0
SUM OF ALL RESPONSES TO PHQ9 QUESTIONS 1 & 2: 0
SUM OF ALL RESPONSES TO PHQ QUESTIONS 1-9: 0
1. LITTLE INTEREST OR PLEASURE IN DOING THINGS: 0
SUM OF ALL RESPONSES TO PHQ QUESTIONS 1-9: 0

## 2020-01-31 NOTE — PROGRESS NOTES
motion and neck supple. Trachea: No tracheal deviation. Cardiovascular:      Rate and Rhythm: Normal rate and regular rhythm. Heart sounds: Normal heart sounds. No murmur. No friction rub. No gallop. Pulmonary:      Effort: Pulmonary effort is normal. No tachypnea, accessory muscle usage or respiratory distress. Breath sounds: Normal breath sounds. No stridor. No decreased breath sounds, wheezing, rhonchi or rales. Abdominal:      Palpations: Abdomen is soft. Musculoskeletal: Normal range of motion. General: No tenderness or deformity. Skin:     General: Skin is warm and dry. Coloration: Skin is not pale. Findings: No erythema or rash. Neurological:      Mental Status: She is alert and oriented to person, place, and time. GCS: GCS eye subscore is 4. GCS verbal subscore is 5. GCS motor subscore is 6. Psychiatric:         Speech: Speech normal.         Behavior: Behavior normal.         Thought Content: Thought content normal.         Judgment: Judgment normal.           Assessment:         1. Thrush    2. Viral URI        Plan:     1. Thrush    - nystatin (MYCOSTATIN) 540029 UNIT/ML suspension; Take 5 mLs by mouth 4 times daily for 10 days  Dispense: 200 mL; Refill: 0    Medication verified with [facility] for pregnancy safety     2. Viral URI    Sx treatment discussed  According to [facility] plain Delsym is safe during pregnancy for cough - however advised to verify with OB  Follow-up if worsened or questions/concerns     Discussed use, benefit, and side effects of prescribed medications. All patient questions answered. Pt voiced understanding. Reviewed health maintenance. Instructed to continue current medications, diet and exercise. Patient agreedwith treatment plan. Follow up as directed.      Electronically signed by EMILIE Denise CNP on 1/31/2020

## 2020-02-03 ENCOUNTER — TELEPHONE (OUTPATIENT)
Dept: FAMILY MEDICINE CLINIC | Age: 30
End: 2020-02-03

## 2020-02-03 NOTE — TELEPHONE ENCOUNTER
Due to the fact she is pregnant, she needs to call her ob-gyne.   The meds I use all have potential toxicity to the baby

## 2021-01-25 ENCOUNTER — HOSPITAL ENCOUNTER (EMERGENCY)
Facility: CLINIC | Age: 31
Discharge: HOME | End: 2021-01-25
Attending: EMERGENCY MEDICINE
Payer: COMMERCIAL

## 2021-01-25 ENCOUNTER — NURSE TRIAGE (OUTPATIENT)
Dept: OTHER | Facility: CLINIC | Age: 31
End: 2021-01-25

## 2021-01-25 VITALS
OXYGEN SATURATION: 98 % | BODY MASS INDEX: 29.16 KG/M2 | HEART RATE: 90 BPM | HEIGHT: 65 IN | DIASTOLIC BLOOD PRESSURE: 91 MMHG | TEMPERATURE: 98.4 F | RESPIRATION RATE: 18 BRPM | WEIGHT: 175 LBS | SYSTOLIC BLOOD PRESSURE: 130 MMHG

## 2021-01-25 DIAGNOSIS — R21 RASH AND OTHER NONSPECIFIC SKIN ERUPTION: Primary | ICD-10-CM

## 2021-01-25 PROCEDURE — 96372 THER/PROPH/DIAG INJ SC/IM: CPT

## 2021-01-25 PROCEDURE — 99282 EMERGENCY DEPT VISIT SF MDM: CPT

## 2021-01-25 PROCEDURE — 6360000002 HC RX W HCPCS: Performed by: EMERGENCY MEDICINE

## 2021-01-25 PROCEDURE — 6370000000 HC RX 637 (ALT 250 FOR IP): Performed by: EMERGENCY MEDICINE

## 2021-01-25 RX ORDER — FAMOTIDINE 20 MG/1
20 TABLET, FILM COATED ORAL ONCE
Status: COMPLETED | OUTPATIENT
Start: 2021-01-25 | End: 2021-01-25

## 2021-01-25 RX ORDER — DEXAMETHASONE SODIUM PHOSPHATE 10 MG/ML
10 INJECTION, SOLUTION INTRAMUSCULAR; INTRAVENOUS ONCE
Status: COMPLETED | OUTPATIENT
Start: 2021-01-25 | End: 2021-01-25

## 2021-01-25 RX ORDER — PREDNISONE 10 MG/1
10 TABLET ORAL SEE ADMIN INSTRUCTIONS
Qty: 17 TABLET | Refills: 0 | Status: SHIPPED | OUTPATIENT
Start: 2021-01-25 | End: 2021-01-31

## 2021-01-25 RX ADMIN — FAMOTIDINE 20 MG: 20 TABLET, FILM COATED ORAL at 17:31

## 2021-01-25 RX ADMIN — DEXAMETHASONE SODIUM PHOSPHATE 10 MG: 10 INJECTION, SOLUTION INTRAMUSCULAR; INTRAVENOUS at 17:32

## 2021-01-25 ASSESSMENT — ENCOUNTER SYMPTOMS
SHORTNESS OF BREATH: 0
VOMITING: 0
DIARRHEA: 0
SORE THROAT: 0

## 2021-01-25 NOTE — TELEPHONE ENCOUNTER
Reason for Disposition   Hives has occurred 3 or more times in the last year and the cause is not known    Answer Assessment - Initial Assessment Questions  1. APPEARANCE: \"What does the rash look like? \"       \"Little red dots\". 2. LOCATION: \"Where is the rash located? \"       Whole body, but not on her face    3. NUMBER: \"How many hives are there? \"       Too many to count    4. SIZE: \"How big are the hives? \" (inches, cm, compare to coins) \"Do they all look the same or is there lots of variation in shape and size? \"       Size of the tip of a needle. All are the same size. 5. ONSET: \"When did the hives begin? \" (Hours or days ago)       Sunday    6. ITCHING: \"Does it itch? \" If so, ask: \"How bad is the itch? \"     - MILD: doesn't interfere with normal activities    - MODERATE-SEVERE: interferes with work, school, sleep, or other activities       Yes, MILD    7. RECURRENT PROBLEM: \"Have you had hives before? \" If so, ask: \"When was the last time? \" and \"What happened that time? \"       Yes, patient reports having sensitive skin and getting hives multiple times. Patient reports she was given RX by her PCP that did not help her. 8. TRIGGERS: \"Were you exposed to any new food, plant, cosmetic product or animal just before the hives began? \"      No    9. OTHER SYMPTOMS: \"Do you have any other symptoms? \" (e.g., fever, tongue swelling, difficulty breathing, abdominal pain)      No    10. PREGNANCY: \"Is there any chance you are pregnant? \" \"When was your last menstrual period? \"        Just gave birth 6 months ago and has not restarted menstrual cycle yet. Patient currently breastfeeding. Protocols used: HIVES-ADULT-[place]    Patient called [facility] Ulisses Mcfadden with red flag complaint. Brief description of triage: See above    Triage indicates for patient to be seen within 3 days in the office.     Care advice provided, patient verbalizes understanding; denies any other questions or concerns; instructed to call back for any new or worsening symptoms. Writer provided message on [employer] chat for [facility] to return call to patient for appointment scheduling. Attention Provider: Thank you for allowing me to participate in the care of your patient. The patient was connected to triage in response to information provided to the [facility]. Please do not respond through this encounter as the response is not directed to a shared pool.

## 2021-01-25 NOTE — ED PROVIDER NOTES
[facility]  [address]  Phone: [facility]  EMERGENCY DEPARTMENT ENCOUNTER      Pt Name: Ally Saldana  MRN: [id number]  [place] 1990  Date of evaluation: 1/25/2021  Provider: Tiffany Ospina DO    CHIEF COMPLAINT       Chief Complaint   Patient presents with    Rash     3 days         HISTORY OF PRESENT ILLNESS   (Location/Symptom, Timing/Onset,Context/Setting, Quality, Duration, Modifying Factors, Severity)  Note limiting factors. Ally Saldana is a 27 y.o. female who presents to the emergency department relation of a rash. She states is been going on for about 3 days. She is had itching all over and she just noticed today that she had some redness on her arms, legs, chest and back and some very small bumps. She has a history of sensitive skin, no history of any recent medication changes, no soap or detergent changes, no exposures that she is aware of, no new foods or medicines. She had a baby 6 months ago and she is breast-feeding so she did not take any medication. She has appointment with her PCP on Friday but she got she might need some relief before that    Nursing Notes were reviewed. REVIEW OF SYSTEMS    (2-9systems for level 4, 10 or more for level 5)     Review of Systems   Constitutional: Negative for fever. HENT: Negative for sore throat. Respiratory: Negative for shortness of breath. Cardiovascular: Negative for chest pain. Gastrointestinal: Negative for diarrhea and vomiting. Genitourinary: Negative for dysuria. Skin: Positive for rash. Neurological: Negative for weakness. All other systems reviewed and are negative. Except asnoted above the remainder of the review of systems was reviewed and negative. PAST MEDICAL HISTORY   No past medical history on file. SURGICAL HISTORY     No past surgical history on file.       CURRENT MEDICATIONS     Previous Medications    PRENATAL VIT-FE FUMARATE-FA (PRENATAL VITAMIN PO)    Take by mouth daily    SERTRALINE (ZOLOFT) 50 MG TABLET    Take 50 mg by mouth daily       ALLERGIES     Pcn [penicillins] and Amoxicillin-pot clavulanate    FAMILY HISTORY     No family history on file.        SOCIAL HISTORY       Social History     Socioeconomic History    Marital status: Single     Spouse name: Not on file    Number of children: 0    Years of education: 15    Highest education level: Not on file   Occupational History    Occupation: [employer]    Social Needs    Financial resource strain: Not on file    Food insecurity     Worry: Not on file     Inability: Not on file   [employer] needs     Medical: Not on file     Non-medical: Not on file   Tobacco Use    Smoking status: Current Every Day Smoker     Packs/day: 0.50     Years: 2.00     Pack years: 1.00     Types: Cigarettes    Smokeless tobacco: Never Used    Tobacco comment: vape   Substance and Sexual Activity    Alcohol use: Yes     Comment: 0-1    Drug use: No    Sexual activity: Yes     Partners: Female   Lifestyle    Physical activity     Days per week: Not on file     Minutes per session: Not on file    Stress: Not on file   Relationships    Social connections     Talks on phone: Not on file     Gets together: Not on file     Attends Congregation service: Not on file     Active member of club or organization: Not on file     Attends meetings of clubs or organizations: Not on file     Relationship status: Not on file    Intimate partner violence     Fear of current or ex partner: Not on file     Emotionally abused: Not on file     Physically abused: Not on file     Forced sexual activity: Not on file   Other Topics Concern    Not on file   Social History Narrative    Diet - good    Caffeine intake per day - 1-2 per day    Exercise pattern - no    Living situation - house with fiance doga dna cat       SCREENINGS             PHYSICAL EXAM    (up to 7 for level DIAGNOSIS/MDM:   Vitals:    Vitals:    01/25/21 1711   BP: (!) 130/91   Pulse: 90   Resp: 18   Temp: 98.4 °F (36.9 °C)   SpO2: 98%   Weight: 79.4 kg (175 lb)   Height: 5' 5\" (1.651 m)       Patient presents to the emergency department with a complaint described above. Vitals are grossly normal and she is nontoxic. Physical examination reveals a maculopapular rash without any red flags. I have very low suspicion this represents underlying severe or systemic illness. Suspect it is a contact dermatitis of some form. I am going to give her Decadron and Pepcid here in the ED and discharged on short course of prednisone. I have talked about the fact that 5% or less of glucocorticoids are secreted into breastmilk which means very small risk for the baby during breast-feeding. Have recommended she keep her appointment on Friday, I have talked about follow-up and what to return to ER for at this time the patient is without objective evidence of an acute process requiring hospitalization or inpatient management. They have remained hemodynamically stable and are stable for discharge with outpatient follow-up. Standard anticipatory guidance given to patient upon discharge. Have given them a specific time frame in which to follow-up and who to follow-up with. I have also advised them that they should return to the emergency department if they get worse, or not getting better or develop any new or concerning symptoms. Patient demonstrates understanding. PROCEDURES:  Unless otherwise noted below, none     Procedures    FINAL IMPRESSION      1.  Rash and other nonspecific skin eruption          DISPOSITION/PLAN   DISPOSITION Decision To Discharge 01/25/2021 05:18:24 PM      PATIENT REFERRED TO:  Lashon Jacobs MD  [address] [place] [address]  [phone]    In 5 days        DISCHARGE MEDICATIONS:  New Prescriptions    PREDNISONE (DELTASONE) 10 MG TABLET    Take 1 tablet by mouth See Admin Instructions for 6 days Take 4 tablets by mouth daily for days 1-2. Take 3 tablets by mouth daily for days 3-4. Take 2 tablets by mouth daily day 5.  Take 1 tablet by mouth on day 6          (Please note that portions of this note were completed with a voice recognition program.  Efforts were made to edit the dictations but occasionally words are mis-transcribed.)    Gurmeet Adorno DO (electronically signed)  Board Certified Emergency Physician          Gurmeet Adorno DO  01/25/21 [id number]

## 2021-01-29 DIAGNOSIS — L50.9 HIVES OF UNKNOWN ORIGIN: Primary | ICD-10-CM

## 2021-06-15 ENCOUNTER — OFFICE VISIT (OUTPATIENT)
Dept: DERMATOLOGY | Age: 31
End: 2021-06-15
Payer: COMMERCIAL

## 2021-06-15 VITALS
BODY MASS INDEX: 30.02 KG/M2 | DIASTOLIC BLOOD PRESSURE: 60 MMHG | WEIGHT: 180.2 LBS | HEIGHT: 65 IN | TEMPERATURE: 97.2 F | OXYGEN SATURATION: 98 % | HEART RATE: 81 BPM | SYSTOLIC BLOOD PRESSURE: 95 MMHG

## 2021-06-15 DIAGNOSIS — L50.9 URTICARIA: Primary | ICD-10-CM

## 2021-06-15 PROCEDURE — 99214 OFFICE O/P EST MOD 30 MIN: CPT | Performed by: DERMATOLOGY

## 2021-06-15 PROCEDURE — G8419 CALC BMI OUT NRM PARAM NOF/U: HCPCS | Performed by: DERMATOLOGY

## 2021-06-15 PROCEDURE — 1036F TOBACCO NON-USER: CPT | Performed by: DERMATOLOGY

## 2021-06-15 PROCEDURE — G8427 DOCREV CUR MEDS BY ELIG CLIN: HCPCS | Performed by: DERMATOLOGY

## 2021-06-15 RX ORDER — CETIRIZINE HYDROCHLORIDE 10 MG/1
CAPSULE, LIQUID FILLED ORAL
Qty: 30 CAPSULE | Refills: 5 | COMMUNITY
Start: 2021-06-15

## 2021-06-15 NOTE — PATIENT INSTRUCTIONS
-Consider Starting Oral Zyrtec twice a day  -Referral to Allergy ( Dr Harish Chavarria)    -Dermatographism is a common, benign skin condition. People who have this condition develop welts or a localized hive-like reaction when they scratch their skin. It can also happen when the skin is exposed to pressure or rubbing. This condition is also called skin writing, dermographia, or dermatographic urticaria      - URTICARIA- HIVES  - Urticaria is the medical name for hives. These are welts, pink swellings that come up on any part of the skin. They itch and each individual hive lasts a few hours before fading away, leaving no trace. New hives appear as old areas fade. They can be pea-sized or join to cover broad areas of the body. While the itch can be intense, the skin is usually not scabbed or broken. In some people the hives burn or sting.  - Hives are very common. About 10-20% of the population will have at least one episode in their lifetime. Hives can sometimes occur in the deeper tissues of the eyes, mouth, hands or genitals. These areas may develop a swelling that is frightening in appearance, but usually goes away in less than 24 hours. This swelling is called angioedema.  - In some cases (acute hives), a single attack of hives is due to an infection or virus and these go away within a few days to a few weeks. Some people get attacks that occur as an allergic reaction to foods, most commonly nuts, chocolate, fish, tomatoes, eggs, fresh berries and milk, insect stings, and medications. In this case, they usually break out within a few hours of the exposure. Usually, the patient figures out the cause by themselves and they never come to a doctor. - Certain people can develop recurrent hives from sunlight, cold, pressure, vibration or exercise. These are called the physical urticarias.   If hives develop from scratching or firmly rubbing the skin it is called dermatographism.   It is the most common of the physical urticarias and it affects about 5% of the population. Dermatographism doesn't always itch, and this condition sometimes occurs along with other forms of hives. - Some people get hives as a reaction to anything that makes them hot or sweaty. This can be sunlight, exercise, hot baths, blushing or anger. These hives, called cholinergic urticaria, are tiny intensely itchy hives with a red blotch around them. - Pressure urticaria shows up as a deep welt in an area of prolonged pressure. Occasionally people get hives from exposure to the cold. Even rarer is urticaria due to sunlight.  - Occasionally, a person will continue to have hives for many years. These hives, called chronic urticaria, can be one of the most frustrating problems dermatologists see. Chronic urticaria is defined as hives lasting longer than 6 weeks. Patients like this come in miserable and worried with this problem, often having seen multiple specialists. Neither the patient nor the doctor can determine the cause of the hives. Patients will often say \"there has to be something causing these hives.   The truth is hard to accept for some patients. - In the overwhelming majority of cases it is not \"something\" causing the chronic hives, it is \"nothing\". That is, in about 95% of chronic hives cases, the hives are \"idiopathic\" (a medical term that means there is no discernible cause). Because of those 5% of cases with a cause, it is worthwhile to see a physician to determine if any underlying disease is present (e.g. thyroid problems, liver problems, skin disease, and sinusitis) or if there is an allergic cause (e.g. a reaction to a drug, insect, food, etc.). This can be accomplished by a good history and physical, a few blood and urine tests and sometimes a skin biopsy. - In about half of patients with chronic idiopathic urticaria, the explanation is that the body's immune system is, in a sense, overactive.   The urticaria is \"autoimmune. \"  The immune system is attacking the normal tissues of the body and causing hives as a result. We know certain urticaria sufferers have other signs of autoimmune problems.  - So in many patients with chronic hives, there is really no exposure (drug, food, insect, chemical) to blame for the urticaria. The patient must understand and accept this for their ideal management. Basically, all that needs to be done is treat the hives. The main treatment of hives is antihistamines, and they will work if they are used properly. Common reasons for lack of effectiveness of antihistamines are:    -  1. The particular antihistamine used is not strong enough  -  2. The antihistamine is not used in a high enough dose  -  3. The antihistamines are not continued for a long enough period  - The most well tolerated initial treatment is non-sedating antihistamines. If that doesn't eliminate the hives, a sedating-type of antihistamine (hydroxyzine, cyproheptadine or doxepin) is added at night. High doses may be needed and this will cause sedation. Fortunately, most patients will become less affected by sedation after they have taken the drug regularly for a while. - Hives that do not respond to antihistamines may be treated with doxepin. Doxepin is an antidepressant that has strong antihistamine effects, blocking both H1 and H2 receptors. Doxepin can be very sedating. Doxepin has not been approved by the FDA for treating hives, but most physicians feel this is an appropriate use. - If that doesn't work, some doctors may try a short course of oral steroids to clear the hives completely. Then the patient can maintain the effect with the much safer antihistamines, since steroids have significant side effects if used long term. - There are other medications that may be added to the antihistamines, but these non-standard therapies are not always effective.   However, if the hives are not responding, they are worth a try. Examples are antacid pills (Tagamet, Zantac), dapsone and sulfasalazine (anti-inflammatory antibiotics). A new medication to treat hives is omalizumab (Xolair). - The important thing is that the patient is given enough medication (antihistamines, perhaps in conjunction with other drugs) to suppress the hives. Whatever it is that controls a patients hives, the important thing is to continue following a daily regimen, taking the drugs every day whether or not they have the hives on any given day. The idea is that you are preventing the hives from breaking out. - Some doctors suggest that medications should be continued for long periods, perhaps even a month after the hives have disappeared. Again, the exception to this is the cortisone/steroid - type medications, which should only be used for short periods initially to quiet down the urticaria. Remember that you must work closely with your doctor to find a medication regimen that suppresses the hives until they resolve on their own. - POSSIBLE CAUSES OF ACUTE HIVES  - Nonsteroidal anti-inflammatory drugs (NSAIDS)-- aspirin, ibuprofen, or naproxen  - Antibiotics--  penicillins, cephalosporins, quinolone antibiotics (e.g. ciprofloxacin) and sulfa antibiotics (e.g. sulfamethoxazole)  - Hormones -- the hormones present in oral contraceptives and hormone replacement therapy  - Painkillers -- (e.g. codeine and morphine), and muscle relaxants used in anesthesia  - Contrast solutions -- given into the vein during x-ray procedures  - Physical contact with allergens -- animal saliva, plant products and resins, raw fish or vegetables, and latex (Latex is present in many medical and household products, including gloves, balloons, and condoms. )  - Insect stings -- stings from certain insects, such as, bees, wasps, hornets, and fire ants.

## 2021-06-15 NOTE — PROGRESS NOTES
Dermatology Patient Note  [id number]  [address]  Dept: [phone]  Dept Fax: [phone]      VISITDATE: 6/15/2021   REFERRING PROVIDER: Jonathan Flores MD      Nereida Li is a 32 y.o. female  who presents today in the office for:    New Patient (Pt states that she has sensitive skin. She states that she breaks out in hives. She broke out in a rash all over a couple months ago and went to the ER and was treated with an injection of dexamethasone and oral Prednisone. )      PERTINENT HISTORY NOT LISTED ABOVE:  Patient presents for evaluation of hives  - she gets a breakout infrequently, about once a month  - it usually happens when she scratches her self or otherwise places pressure on her skin  - her wife cannot give her a massage because her skin itches too much  - she has previously been prescribed zyrtec but admits to not taking it regularly and not sure if it helps  - went to ER for flare that was worse than what she's had before. Got prednisone  - she is currently breastfeeding      CURRENT MEDICATIONS:   Current Outpatient Medications   Medication Sig Dispense Refill    Cetirizine HCl (ZYRTEC ALLERGY) 10 MG CAPS Take one pill PO QD 30 capsule 5    sertraline (ZOLOFT) 50 MG tablet Take 50 mg by mouth daily       No current facility-administered medications for this visit.        ALLERGIES:   Allergies   Allergen Reactions    Pcn [Penicillins]     Amoxicillin-Pot Clavulanate Rash       SOCIAL HISTORY:  Social History     Tobacco Use    Smoking status: Former Smoker     Packs/day: 0.50     Years: 2.00     Pack years: 1.00     Types: Cigarettes    Smokeless tobacco: Never Used    Tobacco comment: vape   Substance Use Topics    Alcohol use: Yes     Comment: 0-1       Pertinent ROS:  Review of Systems  Skin: Denies any new changing, growing or bleeding lesions or rashes except as described in the HPI old areas fade. They can be pea-sized or join to cover broad areas of the body. While the itch can be intense, the skin is usually not scabbed or broken. In some people the hives burn or sting.  - Hives are very common. About 10-20% of the population will have at least one episode in their lifetime. Hives can sometimes occur in the deeper tissues of the eyes, mouth, hands or genitals. These areas may develop a swelling that is frightening in appearance, but usually goes away in less than 24 hours. This swelling is called angioedema.  - In some cases (acute hives), a single attack of hives is due to an infection or virus and these go away within a few days to a few weeks. Some people get attacks that occur as an allergic reaction to foods, most commonly nuts, chocolate, fish, tomatoes, eggs, fresh berries and milk, insect stings, and medications. In this case, they usually break out within a few hours of the exposure. Usually, the patient figures out the cause by themselves and they never come to a doctor. - Certain people can develop recurrent hives from sunlight, cold, pressure, vibration or exercise. These are called the physical urticarias.   If hives develop from scratching or firmly rubbing the skin it is called dermatographism.   It is the most common of the physical urticarias and it affects about 5% of the population. Dermatographism doesn't always itch, and this condition sometimes occurs along with other forms of hives. - Some people get hives as a reaction to anything that makes them hot or sweaty. This can be sunlight, exercise, hot baths, blushing or anger. These hives, called cholinergic urticaria, are tiny intensely itchy hives with a red blotch around them. - Pressure urticaria shows up as a deep welt in an area of prolonged pressure. Occasionally people get hives from exposure to the cold.   Even rarer is urticaria due to sunlight.  - Occasionally, a person will continue to have hives for many years. These hives, called chronic urticaria, can be one of the most frustrating problems dermatologists see. Chronic urticaria is defined as hives lasting longer than 6 weeks. Patients like this come in miserable and worried with this problem, often having seen multiple specialists. Neither the patient nor the doctor can determine the cause of the hives. Patients will often say \"there has to be something causing these hives.   The truth is hard to accept for some patients. - In the overwhelming majority of cases it is not \"something\" causing the chronic hives, it is \"nothing\". That is, in about 95% of chronic hives cases, the hives are \"idiopathic\" (a medical term that means there is no discernible cause). Because of those 5% of cases with a cause, it is worthwhile to see a physician to determine if any underlying disease is present (e.g. thyroid problems, liver problems, skin disease, and sinusitis) or if there is an allergic cause (e.g. a reaction to a drug, insect, food, etc.). This can be accomplished by a good history and physical, a few blood and urine tests and sometimes a skin biopsy. - In about half of patients with chronic idiopathic urticaria, the explanation is that the body's immune system is, in a sense, overactive. The urticaria is \"autoimmune. \"  The immune system is attacking the normal tissues of the body and causing hives as a result. We know certain urticaria sufferers have other signs of autoimmune problems.  - So in many patients with chronic hives, there is really no exposure (drug, food, insect, chemical) to blame for the urticaria. The patient must understand and accept this for their ideal management. Basically, all that needs to be done is treat the hives. The main treatment of hives is antihistamines, and they will work if they are used properly. Common reasons for lack of effectiveness of antihistamines are:    -  1.   The particular antihistamine used is not strong enough  -  2. The antihistamine is not used in a high enough dose  -  3. The antihistamines are not continued for a long enough period  - The most well tolerated initial treatment is non-sedating antihistamines. If that doesn't eliminate the hives, a sedating-type of antihistamine (hydroxyzine, cyproheptadine or doxepin) is added at night. High doses may be needed and this will cause sedation. Fortunately, most patients will become less affected by sedation after they have taken the drug regularly for a while. - Hives that do not respond to antihistamines may be treated with doxepin. Doxepin is an antidepressant that has strong antihistamine effects, blocking both H1 and H2 receptors. Doxepin can be very sedating. Doxepin has not been approved by the FDA for treating hives, but most physicians feel this is an appropriate use. - If that doesn't work, some doctors may try a short course of oral steroids to clear the hives completely. Then the patient can maintain the effect with the much safer antihistamines, since steroids have significant side effects if used long term. - There are other medications that may be added to the antihistamines, but these non-standard therapies are not always effective. However, if the hives are not responding, they are worth a try. Examples are antacid pills (Tagamet, Zantac), dapsone and sulfasalazine (anti-inflammatory antibiotics). A new medication to treat hives is omalizumab (Xolair). - The important thing is that the patient is given enough medication (antihistamines, perhaps in conjunction with other drugs) to suppress the hives. Whatever it is that controls a patients hives, the important thing is to continue following a daily regimen, taking the drugs every day whether or not they have the hives on any given day. The idea is that you are preventing the hives from breaking out.   - Some doctors suggest that medications should be continued for long periods, perhaps even a month after the hives have disappeared. Again, the exception to this is the cortisone/steroid - type medications, which should only be used for short periods initially to quiet down the urticaria. Remember that you must work closely with your doctor to find a medication regimen that suppresses the hives until they resolve on their own. - POSSIBLE CAUSES OF ACUTE HIVES  - Nonsteroidal anti-inflammatory drugs (NSAIDS)-- aspirin, ibuprofen, or naproxen  - Antibiotics--  penicillins, cephalosporins, quinolone antibiotics (e.g. ciprofloxacin) and sulfa antibiotics (e.g. sulfamethoxazole)  - Hormones -- the hormones present in oral contraceptives and hormone replacement therapy  - Painkillers -- (e.g. codeine and morphine), and muscle relaxants used in anesthesia  - Contrast solutions -- given into the vein during x-ray procedures  - Physical contact with allergens -- animal saliva, plant products and resins, raw fish or vegetables, and latex (Latex is present in many medical and household products, including gloves, balloons, and condoms. )  - Insect stings -- stings from certain insects, such as, bees, wasps, hornets, and fire ants. This note was created with the assistance of a speech-recognition program.  Although the intention is to generate a document that actually reflects the content of the visit, no guarantees can be provided that every mistake has been identified and corrected byediting.     Electronically signed by Sarah Clark MD on 6/15/21 at 2:42 PM EDT